# Patient Record
Sex: MALE | Race: BLACK OR AFRICAN AMERICAN | Employment: FULL TIME
[De-identification: names, ages, dates, MRNs, and addresses within clinical notes are randomized per-mention and may not be internally consistent; named-entity substitution may affect disease eponyms.]

---

## 2024-07-18 ENCOUNTER — OFFICE VISIT (OUTPATIENT)
Facility: CLINIC | Age: 34
End: 2024-07-18
Payer: COMMERCIAL

## 2024-07-18 VITALS
DIASTOLIC BLOOD PRESSURE: 80 MMHG | SYSTOLIC BLOOD PRESSURE: 128 MMHG | WEIGHT: 195.2 LBS | OXYGEN SATURATION: 98 % | HEART RATE: 94 BPM | BODY MASS INDEX: 27.33 KG/M2 | RESPIRATION RATE: 16 BRPM | TEMPERATURE: 98.1 F | HEIGHT: 71 IN

## 2024-07-18 DIAGNOSIS — Z00.00 ENCOUNTER FOR WELLNESS EXAMINATION IN ADULT: ICD-10-CM

## 2024-07-18 DIAGNOSIS — M79.674 PAIN AROUND TOENAIL, RIGHT FOOT: ICD-10-CM

## 2024-07-18 DIAGNOSIS — Z23 NEED FOR TDAP VACCINATION: ICD-10-CM

## 2024-07-18 DIAGNOSIS — Z00.00 ENCOUNTER FOR WELLNESS EXAMINATION IN ADULT: Primary | ICD-10-CM

## 2024-07-18 DIAGNOSIS — Z11.3 SCREEN FOR STD (SEXUALLY TRANSMITTED DISEASE): ICD-10-CM

## 2024-07-18 DIAGNOSIS — Z01.83 ENCOUNTER FOR BLOOD TYPING: ICD-10-CM

## 2024-07-18 DIAGNOSIS — B35.1 ONYCHOMYCOSIS: ICD-10-CM

## 2024-07-18 PROBLEM — Z72.0 CURRENT EVERY DAY NICOTINE VAPING: Status: ACTIVE | Noted: 2024-07-18

## 2024-07-18 PROCEDURE — 90471 IMMUNIZATION ADMIN: CPT

## 2024-07-18 PROCEDURE — 99385 PREV VISIT NEW AGE 18-39: CPT

## 2024-07-18 PROCEDURE — 90715 TDAP VACCINE 7 YRS/> IM: CPT

## 2024-07-18 PROCEDURE — 99204 OFFICE O/P NEW MOD 45 MIN: CPT

## 2024-07-18 RX ORDER — CICLOPIROX 80 MG/ML
SOLUTION TOPICAL
Qty: 6 ML | Refills: 5 | Status: SHIPPED | OUTPATIENT
Start: 2024-07-18

## 2024-07-18 SDOH — ECONOMIC STABILITY: HOUSING INSECURITY
IN THE LAST 12 MONTHS, WAS THERE A TIME WHEN YOU DID NOT HAVE A STEADY PLACE TO SLEEP OR SLEPT IN A SHELTER (INCLUDING NOW)?: NO

## 2024-07-18 SDOH — ECONOMIC STABILITY: FOOD INSECURITY: WITHIN THE PAST 12 MONTHS, YOU WORRIED THAT YOUR FOOD WOULD RUN OUT BEFORE YOU GOT MONEY TO BUY MORE.: NEVER TRUE

## 2024-07-18 SDOH — ECONOMIC STABILITY: INCOME INSECURITY: HOW HARD IS IT FOR YOU TO PAY FOR THE VERY BASICS LIKE FOOD, HOUSING, MEDICAL CARE, AND HEATING?: NOT HARD AT ALL

## 2024-07-18 SDOH — ECONOMIC STABILITY: FOOD INSECURITY: WITHIN THE PAST 12 MONTHS, THE FOOD YOU BOUGHT JUST DIDN'T LAST AND YOU DIDN'T HAVE MONEY TO GET MORE.: NEVER TRUE

## 2024-07-18 ASSESSMENT — ENCOUNTER SYMPTOMS
PHOTOPHOBIA: 0
VOMITING: 0
COLOR CHANGE: 1
NAUSEA: 0
COUGH: 0
RHINORRHEA: 0
SHORTNESS OF BREATH: 0
ALLERGIC/IMMUNOLOGIC NEGATIVE: 1
EYE PAIN: 0
DIARRHEA: 0
SORE THROAT: 0
CHEST TIGHTNESS: 0
ABDOMINAL PAIN: 0
ABDOMINAL DISTENTION: 0
BACK PAIN: 0
WHEEZING: 0

## 2024-07-18 ASSESSMENT — PATIENT HEALTH QUESTIONNAIRE - PHQ9
1. LITTLE INTEREST OR PLEASURE IN DOING THINGS: NOT AT ALL
SUM OF ALL RESPONSES TO PHQ QUESTIONS 1-9: 0
2. FEELING DOWN, DEPRESSED OR HOPELESS: NOT AT ALL
SUM OF ALL RESPONSES TO PHQ QUESTIONS 1-9: 0
SUM OF ALL RESPONSES TO PHQ9 QUESTIONS 1 & 2: 0

## 2024-07-18 NOTE — PROGRESS NOTES
\"Have you been to the ER, urgent care clinic since your last visit?  Hospitalized since your last visit?\"    NO    “Have you seen or consulted any other health care providers outside of [facility] since your last visit?”    NO      Chief Complaint   Patient presents with    Annual Exam    New Patient     /80 (Site: Left Upper Arm, Position: Sitting, Cuff Size: Medium Adult)   Pulse 94   Temp 98.1 °F (36.7 °C) (Temporal)   Resp 16   Ht 1.803 m (5' 11\")   Wt 88.5 kg (195 lb 3.2 oz)   SpO2 98%   BMI 27.22 kg/m²         Click Here for Release of Records Request

## 2024-07-18 NOTE — PROGRESS NOTES
Elias Weinbergadonis Barber  34 y.o. male  1990  [address] [place] [address] [place] [address]  [phone]     [place] [facility]: Progress Note       Encounter Date: 7/18/2024    Patient presents with the following chief complaint(s)    Chief Complaint   Patient presents with    Annual Exam    New Patient        History provided by patient    Assessment and Plan:   1. Encounter for wellness examination in adult  -     Vitamin D 25 Hydroxy; Future  -     Hemoglobin A1C; Future  -     Lipid Panel; Future  -     CBC; Future  -     Comprehensive Metabolic Panel; Future  2. Screen for STD (sexually transmitted disease)  Comments:  Orders placed, will contact patient should any results returned positive for tx  Orders:  -     HIV 1/2 Ag/Ab, 4TH Generation,W Rflx Confirm; Future  -     Hepatitis C Antibody; Future  -     Chlamydia, Gonorrhea, Trichomoniasis; Future  3. Need for Tdap vaccination  -     Tdap, BOOSTRIX, (age 10 yrs+), IM  4. Onychomycosis  Comments:  Topical solution given, ref  given to podiatry given duration  Orders:  -     ciclopirox (PENLAC) 8 % solution; Apply topically nightly., Disp-6 mL, R-5, Normal  -     External Referral To Podiatry  5. Pain around toenail, right foot  -     External Referral To Podiatry  6. Encounter for blood typing  -     Type and Screen; Future       A complete physical was completed on the patient at today's visit. Recommend eye exam appointments every year and dental appointments every 6 months.    Recommend continued healthy lifestyle factors to include water intake, 150 minutes of moderate-intensity physical activity per week, and well-balanced diet high in fruits and vegetables and low in saturated fats, low in carbohydrates, and low in processed sugars.  Limit alcohol use and smoking cessation.    Discussed all lab work orders with patient. Patient to obtain lab work fasting 12 hours. Will review once received.     No follow-ups on file.  History of

## 2024-07-22 LAB
C TRACH RRNA SPEC QL NAA+PROBE: NEGATIVE
N GONORRHOEA RRNA SPEC QL NAA+PROBE: NEGATIVE
T VAGINALIS RRNA SPEC QL NAA+PROBE: NEGATIVE

## 2024-07-29 ENCOUNTER — TELEPHONE (OUTPATIENT)
Facility: CLINIC | Age: 34
End: 2024-07-29

## 2024-07-29 NOTE — TELEPHONE ENCOUNTER
----- Message from Octavia John PA-C sent at 7/26/2024 12:38 PM EDT -----  Please contact Elias Baptiste Jr. to let them know that their lab results are normal. Thank you!

## 2024-07-29 NOTE — TELEPHONE ENCOUNTER
Contacted pt, verified name and , informed pt of test results, and that they will be sent to him in the mail, pt verified understanding